# Patient Record
Sex: MALE | Race: WHITE | Employment: FULL TIME | URBAN - METROPOLITAN AREA
[De-identification: names, ages, dates, MRNs, and addresses within clinical notes are randomized per-mention and may not be internally consistent; named-entity substitution may affect disease eponyms.]

---

## 2018-10-26 ENCOUNTER — HOSPITAL ENCOUNTER (EMERGENCY)
Age: 18
Discharge: HOME | End: 2018-10-26
Attending: EMERGENCY MEDICINE
Payer: COMMERCIAL

## 2018-10-26 VITALS
WEIGHT: 176.81 LBS | OXYGEN SATURATION: 97 % | SYSTOLIC BLOOD PRESSURE: 144 MMHG | RESPIRATION RATE: 16 BRPM | BODY MASS INDEX: 26.19 KG/M2 | DIASTOLIC BLOOD PRESSURE: 82 MMHG | TEMPERATURE: 98.1 F | HEART RATE: 92 BPM | HEIGHT: 69 IN

## 2018-10-26 DIAGNOSIS — H66.002 ACUTE SUPPURATIVE OTITIS MEDIA OF LEFT EAR WITHOUT SPONTANEOUS RUPTURE OF TYMPANIC MEMBRANE, RECURRENCE NOT SPECIFIED: Primary | ICD-10-CM

## 2018-10-26 PROCEDURE — 99282 EMERGENCY DEPT VISIT SF MDM: CPT

## 2018-10-26 ASSESSMENT — PAIN DESCRIPTION - PAIN TYPE: TYPE: ACUTE PAIN

## 2018-10-26 ASSESSMENT — PAIN SCALES - GENERAL
PAINLEVEL_OUTOF10: 8
PAINLEVEL_OUTOF10: 8

## 2018-10-26 ASSESSMENT — PAIN DESCRIPTION - ORIENTATION: ORIENTATION: LEFT;RIGHT

## 2018-10-26 ASSESSMENT — PAIN DESCRIPTION - LOCATION: LOCATION: EAR

## 2018-10-26 NOTE — ED PROVIDER NOTES
[facility] EM[facility]GENCY DEPARTMENT    Patient: Bright Medellin  MRN: [id number]  : 2000  Date of Evaluation: 10/26/2018  ED Provider: Faizan Morel DO    Chief Complaint       Chief Complaint   Patient presents with   Kath Sachin     seen at urgent care on 10/23- treated for otitis media. on augmentin, flonase, tessalon, ibuprofen    Nasal Congestion     [place]     Bright Medellin is a 19 y.o. male who presents to the emergency department  For chief complaint of ear pain and URI symptoms. Patient's had the symptoms for over a week, primarily initially with congestion, cough, runny nose and fevers. He stated it worsen a be given a have earaches primarily on the left side so was seen at an urgent care 3 days ago. He was diagnosed with otitis media and started on Flonase and antibiotics. He's been taking these, and states that he is feeling better when he takes them as well as when he takes ibuprofen but his grandmother wanted to come in to be reevaluated because he was still not feeling normal yet. Otherwise denies headache, neck stiffness, chest pain, shortness breath, nausea, vomiting, abdominal pain, changes in bowel movements or urinary symptoms. ROS:     Review of Systems   All other systems reviewed and are negative. Past History   No past medical history on file. No past surgical history on file.   Social History     Social History    Marital status: Single     Spouse name: N/A    Number of children: N/A    Years of education: N/A     Social History Main Topics    Smoking status: Never Smoker    Smokeless tobacco: Never Used    Alcohol use Not on file    Drug use: Unknown    Sexual activity: Not on file     Other Topics Concern    Not on file     Social History Narrative    No narrative on file       Medications/Allergies     Previous Medications    No medications on file     Allergies   Allergen Reactions    Nuts [Peanut-Containing Drug

## 2019-09-24 ENCOUNTER — HOSPITAL ENCOUNTER (OUTPATIENT)
Dept: GENERAL RADIOLOGY | Age: 19
Discharge: HOME | End: 2019-09-24
Payer: COMMERCIAL

## 2019-09-24 ENCOUNTER — HOSPITAL ENCOUNTER (OUTPATIENT)
Age: 19
Discharge: HOME | End: 2019-09-24
Payer: COMMERCIAL

## 2019-09-24 DIAGNOSIS — M21.271: ICD-10-CM

## 2019-09-24 DIAGNOSIS — M21.272: ICD-10-CM

## 2019-09-24 PROCEDURE — 73610 X-RAY EXAM OF ANKLE: CPT
